# Patient Record
Sex: FEMALE | URBAN - METROPOLITAN AREA
[De-identification: names, ages, dates, MRNs, and addresses within clinical notes are randomized per-mention and may not be internally consistent; named-entity substitution may affect disease eponyms.]

---

## 2020-12-10 ENCOUNTER — APPOINTMENT (RX ONLY)
Dept: URBAN - METROPOLITAN AREA CLINIC 55 | Facility: CLINIC | Age: 60
Setting detail: DERMATOLOGY
End: 2020-12-10

## 2020-12-10 DIAGNOSIS — Z41.9 ENCOUNTER FOR PROCEDURE FOR PURPOSES OTHER THAN REMEDYING HEALTH STATE, UNSPECIFIED: ICD-10-CM

## 2020-12-10 PROCEDURE — ? SCULPTRA

## 2020-12-10 PROCEDURE — ? DYSPORT

## 2020-12-10 NOTE — PROCEDURE: DYSPORT
Show Lcl Units: No
Consent: Written consent was obtained.
Post-Care Instructions: After care instructions were provided to the patient.
Glabellar Complex Units: 40
Show Glabellar Units: Yes
Additional Area 5 Units: 0
Detail Level: Simple
Dilution (U/0.1 Cc): 10
Expiration Date (Month Year): 07/31/21
Additional Area 1 Location: chin
Lot #: [id number]

## 2020-12-10 NOTE — PROCEDURE: SCULPTRA
Show Right And Left Buccal Volume?: No
Show Nasolabial Folds Volume?: Yes
Right Lateral Face Filler Volume In Cc: 0
Expiration Date (Month Year): 3/31/2023
Anesthesia Type: 1% lidocaine with epinephrine
Vials Reconstituted (Required For Inventory): 1
Post-Care Instructions: After care instructions were provided to the patient.
Consent: Written consent was obtained.
Anesthesia Volume In Cc: 0.5
Injection Technique: The Sculptra was injected to the areas shown with a 25g cannula after prepping the skin with alcohol and/or chlorhexidine and providing appropriate anesthesia.
Additional Anesthesia Volume In Cc: 6
Volumizer: Sculptra
Detail Level: Simple
Dilution Method: The Sculptra was reconstituted with 8cc sterile water and 2cc 2% plain lidocaine for each vial.
Lot #: [id number]
Map Statement: See Attached Map for Complete Details.

## 2021-03-04 ENCOUNTER — APPOINTMENT (RX ONLY)
Dept: URBAN - METROPOLITAN AREA CLINIC 55 | Facility: CLINIC | Age: 61
Setting detail: DERMATOLOGY
End: 2021-03-04

## 2021-03-04 DIAGNOSIS — Z41.9 ENCOUNTER FOR PROCEDURE FOR PURPOSES OTHER THAN REMEDYING HEALTH STATE, UNSPECIFIED: ICD-10-CM

## 2021-03-04 PROCEDURE — ? SCULPTRA

## 2021-03-04 PROCEDURE — ? DYSPORT

## 2021-03-04 NOTE — PROCEDURE: DYSPORT
Show Mentalis Units: No
Show Additional Area 2: Yes
Lateral Platysmal Bands Units: 0
Expiration Date (Month Year): 09/30/21
Post-Care Instructions: After care instructions were provided to the patient.
Glabellar Complex Units: 40
Detail Level: Simple
Consent: Written consent was obtained.
Additional Area 1 Location: chin
Dilution (U/0.1 Cc): 10
Lot #: [id number]

## 2021-03-04 NOTE — PROCEDURE: SCULPTRA
Forehead Sculptra Filler Volume In Cc: 0
Show Right And Left Dorsal Hands Volume?: No
Show Cheeks Volume?: Yes
Additional Anesthesia Volume In Cc: 6
Map Statement: See Attached Map for Complete Details.
Consent: Written consent was obtained.
Post-Care Instructions: After care instructions were provided to the patient.
Expiration Date (Month Year): 6/30/2023
Dilution Method: The Sculptra was reconstituted with 8cc sterile water and 2cc 2% plain lidocaine for each vial.
Anesthesia Type: 1% lidocaine with epinephrine
Anesthesia Volume In Cc: 0.5
Detail Level: Simple
Injection Technique: The Sculptra was injected to the areas shown in black with a 25g cannula after prepping the skin with alcohol and/or chlorhexidine and providing appropriate anesthesia.
Volumizer: Sculptra
Lot #: [id number]
Vials Reconstituted (Required For Inventory): 1

## 2021-05-21 ENCOUNTER — APPOINTMENT (RX ONLY)
Dept: URBAN - METROPOLITAN AREA CLINIC 55 | Facility: CLINIC | Age: 61
Setting detail: DERMATOLOGY
End: 2021-05-21

## 2021-05-21 DIAGNOSIS — Z41.9 ENCOUNTER FOR PROCEDURE FOR PURPOSES OTHER THAN REMEDYING HEALTH STATE, UNSPECIFIED: ICD-10-CM

## 2021-05-21 PROCEDURE — ? DYSPORT

## 2021-05-21 PROCEDURE — ? SCULPTRA

## 2021-05-21 NOTE — PROCEDURE: SCULPTRA
Show Right And Left Temple Volume?: No
Forehead Sculptra Filler Volume In Cc: 0
Show Jawline Volume?: Yes
Expiration Date (Month Year): 9/30/2023
Lot #: [id number]
Map Statement: See Attached Map for Complete Details.
Detail Level: Simple
Dilution Method: The Sculptra was reconstituted with 8cc sterile water and 2cc 2% plain lidocaine for each vial.
Volumizer: Sculptra
Post-Care Instructions: After care instructions were provided to the patient.
Anesthesia Volume In Cc: 0.5
Vials Reconstituted (Required For Inventory): 1
Consent: Written consent was obtained.
Injection Technique: The Sculptra was injected to the areas shown in black with a 25g cannula after prepping the skin with alcohol and/or chlorhexidine and providing appropriate anesthesia.
Additional Anesthesia Volume In Cc: 6
Anesthesia Type: 1% lidocaine with epinephrine

## 2021-05-21 NOTE — PROCEDURE: DYSPORT
Additional Area 1 Units: 0
Consent: Written consent was obtained.
Show Nasal Units: Yes
Dilution (U/0.1 Cc): 10
Show Right And Left Periorbital Units: No
Additional Area 1 Location: upper lip
Detail Level: Simple
Post-Care Instructions: After care instructions were provided to the patient.
Lot #: [id number]
Expiration Date (Month Year): 1/31/2022
Glabellar Complex Units: 40

## 2022-11-11 ENCOUNTER — APPOINTMENT (RX ONLY)
Dept: URBAN - METROPOLITAN AREA CLINIC 55 | Facility: CLINIC | Age: 62
Setting detail: DERMATOLOGY
End: 2022-11-11

## 2022-11-11 DIAGNOSIS — Z41.9 ENCOUNTER FOR PROCEDURE FOR PURPOSES OTHER THAN REMEDYING HEALTH STATE, UNSPECIFIED: ICD-10-CM

## 2022-11-11 PROCEDURE — ? DYSPORT

## 2022-11-11 PROCEDURE — ? SCULPTRA

## 2022-11-11 NOTE — PROCEDURE: DYSPORT
Periorbital Skin Units: 0
Show Right And Left Brow Units: No
Show Depressor Anguli Units: Yes
Consent was obtained.
Post-Care Instructions: Patient instructed to not lie down for 4 hours and limit physical activity for 24 hours.
Detail Level: Detailed
Glabellar Complex Units: 40
Dilution (U/0.1 Cc): 10
Show Inventory Tab: Show

## 2022-11-11 NOTE — PROCEDURE: SCULPTRA
Show Fourth Additional Location?: Yes
Show Right And Left Middle Malar Volume?: No
Right Temple Hollow Filler Volume In Cc: 0
Consent: Written consent obtained.
Detail Level: Detailed
Show Inventory Tab: Show
Post-Care Instructions: After care instructions were provided to the patient.
Vials Reconstituted (Required For Inventory): 1
Dilution Method: The Sculptra was reconstituted with 8 ml of sterile water and 2cc 2% plain lidocaine for each vial.
Anesthesia Type: 1% lidocaine with epinephrine
Injection Technique: The Sculptra was injected to the areas shown in black with a 25g cannula after prepping the skin with alcohol and /or chlorhexidine and providing appropriate anesthesia.
Anesthesia Volume In Cc: 0.5

## 2023-03-09 ENCOUNTER — APPOINTMENT (RX ONLY)
Dept: URBAN - METROPOLITAN AREA CLINIC 55 | Facility: CLINIC | Age: 63
Setting detail: DERMATOLOGY
End: 2023-03-09

## 2023-03-09 DIAGNOSIS — Z41.9 ENCOUNTER FOR PROCEDURE FOR PURPOSES OTHER THAN REMEDYING HEALTH STATE, UNSPECIFIED: ICD-10-CM

## 2023-03-09 PROCEDURE — ? DYSPORT

## 2023-03-09 PROCEDURE — ? SCULPTRA

## 2023-03-09 NOTE — PROCEDURE: DYSPORT
Lateral Platysmal Bands Units: 0
Show Orbicularis Oculi Units: Yes
Show Right And Left Pupillary Line Units: No
Glabellar Complex Units: 40
Detail Level: Detailed
Show Inventory Tab: Show
Consent was obtained.
Post-Care Instructions: Patient instructed to not lie down for 4 hours and limit physical activity for 24 hours.
Dilution (U/0.1 Cc): 10

## 2023-03-09 NOTE — PROCEDURE: SCULPTRA
Additional Anesthesia Volume In Cc: 0
Show Right And Left Nasolabial Fold Volume?: No
Show Decollete Volume?: Yes
Post-Care Instructions: After care instructions were provided to the patient.
Anesthesia Type: 1% lidocaine with epinephrine
Dilution Method: The Sculptra was reconstituted with 8 ml of sterile water and 2cc 2% plain lidocaine for each vial.
Detail Level: Detailed
Anesthesia Volume In Cc: 0.5
Injection Technique: The Sculptra was injected to the areas shown in black with a 25g cannula after prepping the skin with alcohol and /or chlorhexidine and providing appropriate anesthesia.
Consent: Written consent obtained.
Show Inventory Tab: Show
Vials Reconstituted (Required For Inventory): 1

## 2023-06-01 ENCOUNTER — APPOINTMENT (RX ONLY)
Dept: URBAN - METROPOLITAN AREA CLINIC 55 | Facility: CLINIC | Age: 63
Setting detail: DERMATOLOGY
End: 2023-06-01

## 2023-06-01 DIAGNOSIS — Z41.9 ENCOUNTER FOR PROCEDURE FOR PURPOSES OTHER THAN REMEDYING HEALTH STATE, UNSPECIFIED: ICD-10-CM

## 2023-06-01 PROCEDURE — ? INVENTORY

## 2023-07-13 ENCOUNTER — APPOINTMENT (RX ONLY)
Dept: URBAN - METROPOLITAN AREA CLINIC 55 | Facility: CLINIC | Age: 63
Setting detail: DERMATOLOGY
End: 2023-07-13

## 2023-07-13 DIAGNOSIS — Z41.9 ENCOUNTER FOR PROCEDURE FOR PURPOSES OTHER THAN REMEDYING HEALTH STATE, UNSPECIFIED: ICD-10-CM

## 2023-07-13 PROCEDURE — ? DYSPORT

## 2023-07-13 PROCEDURE — ? SCULPTRA

## 2023-07-13 NOTE — PROCEDURE: DYSPORT
Glabellar Complex Units: 0
Detail Level: Detailed
Show Lateral Platysmal Band Units: Yes
Additional Area 2 Location: [facility]
Consent obtained and risk reviewed.
Show Inventory Tab: Show
Show Ucl Units: No
Post-Care Instructions: Patient instructed to not lie down flat for 4 hours or rub the treatment area.
Dilution (U/0.1 Cc): 10
Forehead Units: 40
Additional Area 1 Location: upper cutaneous

## 2023-11-17 ENCOUNTER — APPOINTMENT (RX ONLY)
Dept: URBAN - METROPOLITAN AREA CLINIC 55 | Facility: CLINIC | Age: 63
Setting detail: DERMATOLOGY
End: 2023-11-17

## 2023-11-17 DIAGNOSIS — Z41.9 ENCOUNTER FOR PROCEDURE FOR PURPOSES OTHER THAN REMEDYING HEALTH STATE, UNSPECIFIED: ICD-10-CM

## 2023-11-17 PROCEDURE — ? SCULPTRA

## 2023-11-17 NOTE — PROCEDURE: SCULPTRA
Left Nasolabial Fold Filler Volume In Cc: 0
Anesthesia Type: 1% lidocaine with epinephrine
Show Right And Left Pa Volume?: No
Show Local Anesthesia?: Yes
Vials Reconstituted (Required For Inventory): 1
Treatment Number: 7
Detail Level: Detailed
Post-Care Instructions: After care instructions were provided to the patient.
Show Inventory Tab: Show
Consent: Written consent obtained.
Anesthesia Volume In Cc: 0.5
Injection Technique: The Sculptra was injected to the areas shown in black with a 25g cannula after prepping the skin with alcohol and /or chlorhexidine and providing appropriate anesthesia.
Dilution Method: The Sculptra was reconstituted with 8 ml of sterile water and 2cc 2% plain lidocaine for each vial.

## 2024-02-15 ENCOUNTER — APPOINTMENT (RX ONLY)
Dept: URBAN - METROPOLITAN AREA CLINIC 55 | Facility: CLINIC | Age: 64
Setting detail: DERMATOLOGY
End: 2024-02-15

## 2024-02-15 DIAGNOSIS — Z41.9 ENCOUNTER FOR PROCEDURE FOR PURPOSES OTHER THAN REMEDYING HEALTH STATE, UNSPECIFIED: ICD-10-CM

## 2024-02-15 PROCEDURE — ? SCULPTRA

## 2024-02-15 PROCEDURE — ? DYSPORT

## 2024-02-15 NOTE — PROCEDURE: DYSPORT
Show Topical Anesthesia: Yes
Dilution (U/0.1 Cc): 10
Additional Area 3 Units: 0
Show Mentalis Units: No
Show Inventory Tab: Show
Detail Level: Detailed
Glabellar Complex Units: 40
Consent was obtained.
Post-Care Instructions: Patient instructed to not lie down for 4 hours and limit physical activity for 24 hours.

## 2024-02-15 NOTE — PROCEDURE: SCULPTRA
Right Tear Trough Filler Volume In Cc: 0
Show Right And Left Tear Trough Volume?: No
Show Decollete Volume?: Yes
Vials Reconstituted (Required For Inventory): 1
Anesthesia Type: 1% lidocaine with epinephrine
Dilution Method: The Sculptra was reconstituted with 8 ml of sterile water and 2cc 2% plain lidocaine for each vial.
Injection Technique: The Sculptra was injected to the areas shown in black with a 25g cannula after prepping the skin with alcohol and /or chlorhexidine and providing appropriate anesthesia.
Anesthesia Volume In Cc: 0.5
Consent: Written consent obtained.
Detail Level: Detailed
Post-Care Instructions: After care instructions were provided to the patient.
Show Inventory Tab: Show

## 2024-04-11 ENCOUNTER — APPOINTMENT (RX ONLY)
Dept: URBAN - METROPOLITAN AREA CLINIC 55 | Facility: CLINIC | Age: 64
Setting detail: DERMATOLOGY
End: 2024-04-11

## 2024-04-11 DIAGNOSIS — Z41.9 ENCOUNTER FOR PROCEDURE FOR PURPOSES OTHER THAN REMEDYING HEALTH STATE, UNSPECIFIED: ICD-10-CM

## 2024-04-11 PROCEDURE — ? SCULPTRA

## 2024-04-11 NOTE — PROCEDURE: SCULPTRA
Right Temple Hollow Filler Volume In Cc: 0
Show Right And Left Cheek Volume?: No
Render Post-Care In The Note: Yes
Detail Level: Detailed
Post-Care Instructions: After care instructions were provided to the patient.
Show Inventory Tab: Show
Vials Reconstituted (Required For Inventory): 1
Anesthesia Type: 1% lidocaine with epinephrine
Dilution Method: The Sculptra was reconstituted with 8 ml of sterile water and 2cc 2% plain lidocaine for each vial.
Consent: Written consent obtained.
Injection Technique: The Sculptra was injected to the areas shown in black with a 25g cannula after prepping the skin with alcohol and /or chlorhexidine and providing appropriate anesthesia.
Anesthesia Volume In Cc: 0.5

## 2024-05-22 ENCOUNTER — APPOINTMENT (RX ONLY)
Dept: URBAN - METROPOLITAN AREA CLINIC 55 | Facility: CLINIC | Age: 64
Setting detail: DERMATOLOGY
End: 2024-05-22

## 2024-05-22 DIAGNOSIS — Z41.9 ENCOUNTER FOR PROCEDURE FOR PURPOSES OTHER THAN REMEDYING HEALTH STATE, UNSPECIFIED: ICD-10-CM

## 2024-05-22 PROCEDURE — ? INVENTORY

## 2024-05-22 PROCEDURE — ? FILLERS

## 2024-05-22 NOTE — PROCEDURE: FILLERS
Additional Area 1 Volume In Cc: 0
Include Documentation That Aspiration Was Performed Prior To Injecting Filler:: No
Map Statment: See Attach Map for Complete Details
Anesthesia Type: 1% lidocaine with epinephrine
Anesthesia Volume In Cc: 0.5
Consent was obtained.
Post-Care Instructions: After care instructions were provided verbally and in writing.
Include Cannula Information In Note?: Yes
Detail Level: Detailed
Include Cannula Size?: 25G
Filler: Restylane Defyne

## 2024-07-10 ENCOUNTER — APPOINTMENT (RX ONLY)
Dept: URBAN - METROPOLITAN AREA CLINIC 55 | Facility: CLINIC | Age: 64
Setting detail: DERMATOLOGY
End: 2024-07-10

## 2024-07-10 DIAGNOSIS — Z41.9 ENCOUNTER FOR PROCEDURE FOR PURPOSES OTHER THAN REMEDYING HEALTH STATE, UNSPECIFIED: ICD-10-CM

## 2024-07-10 PROCEDURE — ? FILLERS

## 2024-07-10 PROCEDURE — ? INVENTORY

## 2024-07-10 NOTE — PROCEDURE: FILLERS
Vermilion Lips Filler Volume In Cc: 0
Include Cannula Information In Note?: No
Include Cannula Information In Note?: Yes
Include Cannula Size?: 25G
Anesthesia Type: 1% lidocaine with epinephrine
Anesthesia Volume In Cc: 0.5
Include Cannula Size?: 27G
Detail Level: Detailed
Consent was obtained.
Post-Care Instructions: After care instructions were provided verbally and in writing.
Filler: Restylane Defyne
Map Statment: See Attach Map for Complete Details